# Patient Record
Sex: FEMALE | Race: WHITE | NOT HISPANIC OR LATINO | URBAN - METROPOLITAN AREA
[De-identification: names, ages, dates, MRNs, and addresses within clinical notes are randomized per-mention and may not be internally consistent; named-entity substitution may affect disease eponyms.]

---

## 2023-05-17 LAB
ANION GAP IN SER/PLAS: 10 MMOL/L (ref 10–20)
BASOPHILS (10*3/UL) IN BLOOD BY AUTOMATED COUNT: 0.03 X10E9/L (ref 0–0.1)
BASOPHILS/100 LEUKOCYTES IN BLOOD BY AUTOMATED COUNT: 0.5 % (ref 0–2)
CALCIUM (MG/DL) IN SER/PLAS: 10 MG/DL (ref 8.6–10.3)
CARBON DIOXIDE, TOTAL (MMOL/L) IN SER/PLAS: 27 MMOL/L (ref 21–32)
CHLORIDE (MMOL/L) IN SER/PLAS: 106 MMOL/L (ref 98–107)
CREATININE (MG/DL) IN SER/PLAS: 0.83 MG/DL (ref 0.5–1.05)
EOSINOPHILS (10*3/UL) IN BLOOD BY AUTOMATED COUNT: 0.17 X10E9/L (ref 0–0.4)
EOSINOPHILS/100 LEUKOCYTES IN BLOOD BY AUTOMATED COUNT: 2.7 % (ref 0–6)
ERYTHROCYTE DISTRIBUTION WIDTH (RATIO) BY AUTOMATED COUNT: 12.6 % (ref 11.5–14.5)
ERYTHROCYTE MEAN CORPUSCULAR HEMOGLOBIN CONCENTRATION (G/DL) BY AUTOMATED: 32.3 G/DL (ref 32–36)
ERYTHROCYTE MEAN CORPUSCULAR VOLUME (FL) BY AUTOMATED COUNT: 95 FL (ref 80–100)
ERYTHROCYTES (10*6/UL) IN BLOOD BY AUTOMATED COUNT: 3.71 X10E12/L (ref 4–5.2)
GFR FEMALE: 69 ML/MIN/1.73M2
GLUCOSE (MG/DL) IN SER/PLAS: 90 MG/DL (ref 74–99)
HEMATOCRIT (%) IN BLOOD BY AUTOMATED COUNT: 35.3 % (ref 36–46)
HEMOGLOBIN (G/DL) IN BLOOD: 11.4 G/DL (ref 12–16)
IMMATURE GRANULOCYTES/100 LEUKOCYTES IN BLOOD BY AUTOMATED COUNT: 0.2 % (ref 0–0.9)
LEUKOCYTES (10*3/UL) IN BLOOD BY AUTOMATED COUNT: 6.3 X10E9/L (ref 4.4–11.3)
LYMPHOCYTES (10*3/UL) IN BLOOD BY AUTOMATED COUNT: 1.49 X10E9/L (ref 0.8–3)
LYMPHOCYTES/100 LEUKOCYTES IN BLOOD BY AUTOMATED COUNT: 23.5 % (ref 13–44)
MONOCYTES (10*3/UL) IN BLOOD BY AUTOMATED COUNT: 0.59 X10E9/L (ref 0.05–0.8)
MONOCYTES/100 LEUKOCYTES IN BLOOD BY AUTOMATED COUNT: 9.3 % (ref 2–10)
NEUTROPHILS (10*3/UL) IN BLOOD BY AUTOMATED COUNT: 4.05 X10E9/L (ref 1.6–5.5)
NEUTROPHILS/100 LEUKOCYTES IN BLOOD BY AUTOMATED COUNT: 63.8 % (ref 40–80)
NRBC (PER 100 WBCS) BY AUTOMATED COUNT: 0 /100 WBC (ref 0–0)
PLATELETS (10*3/UL) IN BLOOD AUTOMATED COUNT: 294 X10E9/L (ref 150–450)
POTASSIUM (MMOL/L) IN SER/PLAS: 4.3 MMOL/L (ref 3.5–5.3)
SODIUM (MMOL/L) IN SER/PLAS: 139 MMOL/L (ref 136–145)
UREA NITROGEN (MG/DL) IN SER/PLAS: 23 MG/DL (ref 6–23)

## 2023-05-18 LAB
APPEARANCE, URINE: ABNORMAL
BACTERIA, URINE: ABNORMAL /HPF
BILIRUBIN, URINE: NEGATIVE
BLOOD, URINE: ABNORMAL
CALCIUM OXALATE CRYSTALS, URINE: ABNORMAL /HPF
COLOR, URINE: YELLOW
GLUCOSE, URINE: NEGATIVE MG/DL
KETONES, URINE: NEGATIVE MG/DL
LEUKOCYTE ESTERASE, URINE: ABNORMAL
MUCUS, URINE: ABNORMAL /LPF
NITRITE, URINE: NEGATIVE
PH, URINE: 5 (ref 5–8)
PROTEIN, URINE: NEGATIVE MG/DL
RBC, URINE: 5 /HPF (ref 0–5)
SPECIFIC GRAVITY, URINE: 1.02 (ref 1–1.03)
SQUAMOUS EPITHELIAL CELLS, URINE: 2 /HPF
UROBILINOGEN, URINE: 2 MG/DL (ref 0–1.9)
WBC, URINE: 14 /HPF (ref 0–5)

## 2023-05-19 LAB — URINE CULTURE: NO GROWTH

## 2023-06-23 ENCOUNTER — NURSING HOME VISIT (OUTPATIENT)
Dept: POST ACUTE CARE | Facility: EXTERNAL LOCATION | Age: 88
End: 2023-06-23
Payer: MEDICARE

## 2023-06-23 DIAGNOSIS — E78.00 PURE HYPERCHOLESTEROLEMIA: ICD-10-CM

## 2023-06-23 DIAGNOSIS — F02.80 ALZHEIMERS DISEASE (MULTI): Primary | ICD-10-CM

## 2023-06-23 DIAGNOSIS — M81.0 AGE RELATED OSTEOPOROSIS, UNSPECIFIED PATHOLOGICAL FRACTURE PRESENCE: ICD-10-CM

## 2023-06-23 DIAGNOSIS — G30.9 ALZHEIMERS DISEASE (MULTI): Primary | ICD-10-CM

## 2023-06-23 DIAGNOSIS — H90.3 BILATERAL SENSORINEURAL HEARING LOSS: ICD-10-CM

## 2023-06-23 DIAGNOSIS — E78.2 MIXED HYPERLIPIDEMIA: ICD-10-CM

## 2023-06-23 PROCEDURE — 99305 1ST NF CARE MODERATE MDM 35: CPT | Performed by: INTERNAL MEDICINE

## 2023-06-23 NOTE — LETTER
Patient: Jolene Jack  : 1935    Encounter Date: 2023    HISTORY & PHYSICAL    Subjective  Chief complaint: Jolene Jack is a 87 y.o. female who is a long term resident patient being seen and evaluated for multiple medical problems.  Patient presents for weakness.    HPI:  Patient was transferred from a different nursing facility. Patient has a medical history of hearing loss, and dementia. Patient will be in the long term resident facility. She is a poor historian due to dementia. She is very hard of hearing.  She has no acute complaints. No issues per nursing.        Past Medical History:   Diagnosis Date   • Personal history of diseases of the skin and subcutaneous tissue     History of rosacea   • Personal history of other diseases of the nervous system and sense organs 2020    History of hearing loss   • Personal history of other diseases of the nervous system and sense organs 2014    History of otitis media   • Personal history of other specified conditions     History of fibrocystic disease of breast   • Trigger finger, unspecified finger     Trigger finger       Past Surgical History:   Procedure Laterality Date   • OTHER SURGICAL HISTORY  2021    Hand surgery       Family History   Problem Relation Name Age of Onset   • No Known Problems Mother     • No Known Problems Father         Social History     Socioeconomic History   • Marital status:      Spouse name: Not on file   • Number of children: Not on file   • Years of education: Not on file   • Highest education level: Not on file   Occupational History   • Not on file   Tobacco Use   • Smoking status: Not on file   • Smokeless tobacco: Not on file   Substance and Sexual Activity   • Alcohol use: Not on file   • Drug use: Not on file   • Sexual activity: Not on file   Other Topics Concern   • Not on file   Social History Narrative   • Not on file     Social Determinants of Health     Financial Resource  Strain: Not on file   Food Insecurity: Not on file   Transportation Needs: Not on file   Physical Activity: Not on file   Stress: Not on file   Social Connections: Not on file   Intimate Partner Violence: Not on file   Housing Stability: Not on file       Vital signs: 126/71,97.7, 76, 18, 96%    Objective  Physical Exam  Constitutional:       Comments: Frail elderly female   HENT:      Head: Normocephalic and atraumatic.      Nose: Nose normal.      Mouth/Throat:      Mouth: Mucous membranes are moist.      Pharynx: Oropharynx is clear.   Eyes:      Extraocular Movements: Extraocular movements intact.      Pupils: Pupils are equal, round, and reactive to light.   Cardiovascular:      Rate and Rhythm: Normal rate and regular rhythm.   Pulmonary:      Effort: No respiratory distress.      Breath sounds: Normal breath sounds. No wheezing, rhonchi or rales.   Abdominal:      General: Bowel sounds are normal. There is no distension.      Palpations: Abdomen is soft.      Tenderness: There is no abdominal tenderness. There is no guarding.   Musculoskeletal:      Right lower leg: No edema.      Left lower leg: No edema.   Skin:     General: Skin is warm and dry.   Neurological:      Mental Status: She is alert. Mental status is at baseline.         Assessment/Plan  Problem List Items Addressed This Visit       Alzheimers disease (CMS/[facility]) - Primary    Hyperlipidemia    Bilateral sensorineural hearing loss    Pure hypercholesterolemia    Osteoporosis     Medications, treatments, and labs reviewed  Continue medications and treatments as listed in [facility]    Scribe Attestation  I, Belkys Wallace Scribe   attest that this documentation has been prepared under the direction and in the presence of Andrez Aguilar DO.    An interactive audio and/or video telecommunication system which permits real time communications between the patient (at the originating site) and provider (at a distant site) was utilized to provide this  telehealth service after obtaining verbal consent.    Provider Attestation - Scribe documentation  All medical record entries made by the Scribe were at my direction and personally dictated by me. I have reviewed the chart and agree that the record accurately reflects my personal performance of the history, physical exam, discussion and plan.    Andrez Aguilar DO          Electronically Signed By: Andrez Aguilar DO   6/29/23  9:56 PM

## 2023-06-26 NOTE — PROGRESS NOTES
HISTORY & PHYSICAL    Subjective   Chief complaint: Jolene Jack is a 87 y.o. female who is a long term resident patient being seen and evaluated for multiple medical problems.  Patient presents for weakness.    HPI:  Patient was transferred from a different nursing facility. Patient has a medical history of hearing loss, and dementia. Patient will be in the long term resident facility. She is a poor historian due to dementia. She is very hard of hearing.  She has no acute complaints. No issues per nursing.        Past Medical History:   Diagnosis Date    Personal history of diseases of the skin and subcutaneous tissue     History of rosacea    Personal history of other diseases of the nervous system and sense organs 11/04/2020    History of hearing loss    Personal history of other diseases of the nervous system and sense organs 08/19/2014    History of otitis media    Personal history of other specified conditions     History of fibrocystic disease of breast    Trigger finger, unspecified finger     Trigger finger       Past Surgical History:   Procedure Laterality Date    OTHER SURGICAL HISTORY  08/02/2021    Hand surgery       Family History   Problem Relation Name Age of Onset    No Known Problems Mother      No Known Problems Father         Social History     Socioeconomic History    Marital status:      Spouse name: Not on file    Number of children: Not on file    Years of education: Not on file    Highest education level: Not on file   Occupational History    Not on file   Tobacco Use    Smoking status: Not on file    Smokeless tobacco: Not on file   Substance and Sexual Activity    Alcohol use: Not on file    Drug use: Not on file    Sexual activity: Not on file   Other Topics Concern    Not on file   Social History Narrative    Not on file     Social Determinants of Health     Financial Resource Strain: Not on file   Food Insecurity: Not on file   Transportation Needs: Not on file   Physical  Activity: Not on file   Stress: Not on file   Social Connections: Not on file   Intimate Partner Violence: Not on file   Housing Stability: Not on file       Vital signs: 126/71,97.7, 76, 18, 96%    Objective   Physical Exam  Constitutional:       Comments: Frail elderly female   HENT:      Head: Normocephalic and atraumatic.      Nose: Nose normal.      Mouth/Throat:      Mouth: Mucous membranes are moist.      Pharynx: Oropharynx is clear.   Eyes:      Extraocular Movements: Extraocular movements intact.      Pupils: Pupils are equal, round, and reactive to light.   Cardiovascular:      Rate and Rhythm: Normal rate and regular rhythm.   Pulmonary:      Effort: No respiratory distress.      Breath sounds: Normal breath sounds. No wheezing, rhonchi or rales.   Abdominal:      General: Bowel sounds are normal. There is no distension.      Palpations: Abdomen is soft.      Tenderness: There is no abdominal tenderness. There is no guarding.   Musculoskeletal:      Right lower leg: No edema.      Left lower leg: No edema.   Skin:     General: Skin is warm and dry.   Neurological:      Mental Status: She is alert. Mental status is at baseline.         Assessment/Plan   Problem List Items Addressed This Visit       Alzheimers disease (CMS/[facility]) - Primary    Hyperlipidemia    Bilateral sensorineural hearing loss    Pure hypercholesterolemia    Osteoporosis     Medications, treatments, and labs reviewed  Continue medications and treatments as listed in [facility]    Scribe Attestation  I, Johan Pop   attest that this documentation has been prepared under the direction and in the presence of Andrez Aguilar DO.    An interactive audio and/or video telecommunication system which permits real time communications between the patient (at the originating site) and provider (at a distant site) was utilized to provide this telehealth service after obtaining verbal consent.    Provider Attestation - Scribe  documentation  All medical record entries made by the Scribe were at my direction and personally dictated by me. I have reviewed the chart and agree that the record accurately reflects my personal performance of the history, physical exam, discussion and plan.    Andrez Aguilar, DO

## 2023-06-29 PROBLEM — G30.9 ALZHEIMERS DISEASE (MULTI): Status: ACTIVE | Noted: 2023-06-29

## 2023-06-29 PROBLEM — E78.5 HYPERLIPIDEMIA: Status: ACTIVE | Noted: 2023-06-29

## 2023-06-29 PROBLEM — H90.3 BILATERAL SENSORINEURAL HEARING LOSS: Status: ACTIVE | Noted: 2023-06-29

## 2023-06-29 PROBLEM — M81.0 OSTEOPOROSIS: Status: ACTIVE | Noted: 2023-06-29

## 2023-06-29 PROBLEM — E78.00 PURE HYPERCHOLESTEROLEMIA: Status: ACTIVE | Noted: 2023-06-29

## 2023-06-29 PROBLEM — F02.80 ALZHEIMERS DISEASE (MULTI): Status: ACTIVE | Noted: 2023-06-29

## 2023-07-26 ENCOUNTER — NURSING HOME VISIT (OUTPATIENT)
Dept: POST ACUTE CARE | Facility: EXTERNAL LOCATION | Age: 88
End: 2023-07-26
Payer: MEDICARE

## 2023-07-26 DIAGNOSIS — M81.0 AGE RELATED OSTEOPOROSIS, UNSPECIFIED PATHOLOGICAL FRACTURE PRESENCE: ICD-10-CM

## 2023-07-26 DIAGNOSIS — F02.80 ALZHEIMERS DISEASE (MULTI): Primary | ICD-10-CM

## 2023-07-26 DIAGNOSIS — G30.9 ALZHEIMERS DISEASE (MULTI): Primary | ICD-10-CM

## 2023-07-26 PROCEDURE — 99308 SBSQ NF CARE LOW MDM 20: CPT | Performed by: REGISTERED NURSE

## 2023-07-26 NOTE — LETTER
Patient: Jolene Jack  : 1935    Encounter Date: 2023    PROGRESS NOTE    Subjective  Chief complaint: Jolene Jack is a 87 y.o. female who is a long term care patient being seen and evaluated for monthly general medical care and follow-up.    HPI:  Patient presents for general medical care and f/u.  Patient seen and examined at bedside. Patient has dementia and requires assistance with ADLs. CKD, denies change in urinary frequency or appetite decrease.  No issues per nursing.  Patient has no acute complaints.      Objective  Vital signs: 126/71,76,96% RA    Physical Exam  Constitutional:       General: She is not in acute distress.  Eyes:      Extraocular Movements: Extraocular movements intact.   Pulmonary:      Effort: Pulmonary effort is normal.   Musculoskeletal:      Cervical back: Neck supple.   Neurological:      Mental Status: She is alert.   Psychiatric:         Mood and Affect: Mood normal.         Behavior: Behavior is cooperative.         Assessment/Plan  Problem List Items Addressed This Visit       Alzheimers disease (CMS/[facility]) - Primary     Patient at baseline no behaviors reported by staff         Osteoporosis     Patient appears free from pain          Medications, treatments, and labs reviewed  Continue medications and treatments as listed in EMR      Scribe Attestation  Jeainne VAUGHN Scribe   attest that this documentation has been prepared under the direction and in the presence of MEI Charles.     Provider Attestation - Scribe documentation  All medical record entries made by the Scribe were at my direction and personally dictated by me. I have reviewed the chart and agree that the record accurately reflects my personal performance of the history, physical exam, discussion and plan.   MIE Charles      Electronically Signed By: MEI Charles   23 11:37 AM

## 2023-08-30 NOTE — PROGRESS NOTES
PROGRESS NOTE    Subjective   Chief complaint: Jolene Jack is a 87 y.o. female who is a long term care patient being seen and evaluated for monthly general medical care and follow-up.    HPI:  Patient presents for general medical care and f/u.  Patient seen and examined at bedside. Patient has dementia and requires assistance with ADLs. CKD, denies change in urinary frequency or appetite decrease.  No issues per nursing.  Patient has no acute complaints.      Objective   Vital signs: 126/71,76,96% RA    Physical Exam  Constitutional:       General: She is not in acute distress.  Eyes:      Extraocular Movements: Extraocular movements intact.   Pulmonary:      Effort: Pulmonary effort is normal.   Musculoskeletal:      Cervical back: Neck supple.   Neurological:      Mental Status: She is alert.   Psychiatric:         Mood and Affect: Mood normal.         Behavior: Behavior is cooperative.         Assessment/Plan   Problem List Items Addressed This Visit       Alzheimers disease (CMS/HCC) - Primary     Patient at baseline no behaviors reported by staff         Osteoporosis     Patient appears free from pain          Medications, treatments, and labs reviewed  Continue medications and treatments as listed in EMR      Scribe Attestation  Jeanine VAUGHN Scribe   attest that this documentation has been prepared under the direction and in the presence of MEI Charles.     Provider Attestation - Scribe documentation  All medical record entries made by the Scribe were at my direction and personally dictated by me. I have reviewed the chart and agree that the record accurately reflects my personal performance of the history, physical exam, discussion and plan.   MEI Charles

## 2023-08-31 ENCOUNTER — NURSING HOME VISIT (OUTPATIENT)
Dept: POST ACUTE CARE | Facility: EXTERNAL LOCATION | Age: 88
End: 2023-08-31
Payer: MEDICARE

## 2023-08-31 DIAGNOSIS — G30.9 ALZHEIMERS DISEASE (MULTI): Primary | ICD-10-CM

## 2023-08-31 DIAGNOSIS — F02.80 ALZHEIMERS DISEASE (MULTI): Primary | ICD-10-CM

## 2023-08-31 DIAGNOSIS — F32.A DEPRESSION, UNSPECIFIED DEPRESSION TYPE: ICD-10-CM

## 2023-08-31 DIAGNOSIS — G47.00 INSOMNIA, UNSPECIFIED TYPE: ICD-10-CM

## 2023-08-31 PROCEDURE — 99309 SBSQ NF CARE MODERATE MDM 30: CPT

## 2023-08-31 NOTE — LETTER
Patient: Jolene Jack  : 1935    Encounter Date: 2023    PROGRESS NOTE    Subjective  Chief complaint: Jolene Jack is a 87 y.o. female who is a long term care patient being seen and evaluated for  general medical care and monthly follow-up    HPI:  Patient seen and evaluated for general medical care and monthly follow-up.  Patient at baseline mentation, calm, cooperative, pleasant.  Patient converses easily.  History of depression.  Patient interacts with others.  No changes in appetite or sleep.  Ensure safe environment in secured unit.  No concerns per nursing.          Objective  Vital signs:  126/71-97.7-76-18-96%    Physical Exam  Constitutional:       Appearance: Normal appearance.   HENT:      Head: Normocephalic.      Mouth/Throat:      Mouth: Mucous membranes are moist.   Eyes:      Extraocular Movements: Extraocular movements intact.   Cardiovascular:      Rate and Rhythm: Normal rate and regular rhythm.      Pulses: Normal pulses.      Heart sounds: Normal heart sounds.   Pulmonary:      Effort: Pulmonary effort is normal.      Breath sounds: Normal breath sounds.   Abdominal:      General: Bowel sounds are normal.      Palpations: Abdomen is soft.   Musculoskeletal:         General: Normal range of motion.      Cervical back: Normal range of motion and neck supple.      Comments:   Generalized weakness   Skin:     General: Skin is warm and dry.      Capillary Refill: Capillary refill takes less than 2 seconds.   Neurological:      Mental Status: She is alert. Mental status is at baseline.   Psychiatric:         Mood and Affect: Mood normal.         Behavior: Behavior normal.         Assessment/Plan  Problem List Items Addressed This Visit       Alzheimers disease (CMS/HCC) - Primary      Stable   baseline mentation   ensure safety and secured unit   no outbursts or agitation per nursing   Memantine   monitor         Insomnia      Stable   sleeping well   Melatonin   monitor          Depression      Stable   no self-isolation   interacting with others   no changes in appetite or sleep   Mirtazapine   monitor          Medications, treatments, and labs reviewed  Continue medications and treatments as listed in EMR    MEI Walker      Electronically Signed By: MEI Walker   9/10/23  8:59 PM

## 2023-09-10 PROBLEM — R53.1 WEAKNESS: Status: ACTIVE | Noted: 2023-09-10

## 2023-09-10 PROBLEM — G47.00 INSOMNIA: Status: ACTIVE | Noted: 2023-09-10

## 2023-09-10 PROBLEM — F32.A DEPRESSION: Status: ACTIVE | Noted: 2023-09-10

## 2023-09-11 NOTE — PROGRESS NOTES
PROGRESS NOTE    Subjective   Chief complaint: Jolene Jack is a 87 y.o. female who is a long term care patient being seen and evaluated for  general medical care and monthly follow-up    HPI:  Patient seen and evaluated for general medical care and monthly follow-up.  Patient at baseline mentation, calm, cooperative, pleasant.  Patient converses easily.  History of depression.  Patient interacts with others.  No changes in appetite or sleep.  Ensure safe environment in secured unit.  No concerns per nursing.          Objective   Vital signs:  126/71-97.7-76-18-96%    Physical Exam  Constitutional:       Appearance: Normal appearance.   HENT:      Head: Normocephalic.      Mouth/Throat:      Mouth: Mucous membranes are moist.   Eyes:      Extraocular Movements: Extraocular movements intact.   Cardiovascular:      Rate and Rhythm: Normal rate and regular rhythm.      Pulses: Normal pulses.      Heart sounds: Normal heart sounds.   Pulmonary:      Effort: Pulmonary effort is normal.      Breath sounds: Normal breath sounds.   Abdominal:      General: Bowel sounds are normal.      Palpations: Abdomen is soft.   Musculoskeletal:         General: Normal range of motion.      Cervical back: Normal range of motion and neck supple.      Comments:   Generalized weakness   Skin:     General: Skin is warm and dry.      Capillary Refill: Capillary refill takes less than 2 seconds.   Neurological:      Mental Status: She is alert. Mental status is at baseline.   Psychiatric:         Mood and Affect: Mood normal.         Behavior: Behavior normal.         Assessment/Plan   Problem List Items Addressed This Visit       Alzheimers disease (CMS/HCC) - Primary      Stable   baseline mentation   ensure safety and secured unit   no outbursts or agitation per nursing   Memantine   monitor         Insomnia      Stable   sleeping well   Melatonin   monitor         Depression      Stable   no self-isolation   interacting with  others   no changes in appetite or sleep   Mirtazapine   monitor          Medications, treatments, and labs reviewed  Continue medications and treatments as listed in EMR    Riya Barfield, APRN-CNP

## 2023-09-11 NOTE — ASSESSMENT & PLAN NOTE
Stable   no self-isolation   interacting with others   no changes in appetite or sleep   Mirtazapine   monitor

## 2023-09-11 NOTE — ASSESSMENT & PLAN NOTE
Stable   baseline mentation   ensure safety and secured unit   no outbursts or agitation per nursing   Memantine   monitor

## 2023-09-26 ENCOUNTER — NURSING HOME VISIT (OUTPATIENT)
Dept: POST ACUTE CARE | Facility: EXTERNAL LOCATION | Age: 88
End: 2023-09-26
Payer: MEDICARE

## 2023-09-26 DIAGNOSIS — R53.1 WEAKNESS: ICD-10-CM

## 2023-09-26 DIAGNOSIS — F02.80 ALZHEIMERS DISEASE (MULTI): Primary | ICD-10-CM

## 2023-09-26 DIAGNOSIS — F32.A DEPRESSION, UNSPECIFIED DEPRESSION TYPE: ICD-10-CM

## 2023-09-26 DIAGNOSIS — G30.9 ALZHEIMERS DISEASE (MULTI): Primary | ICD-10-CM

## 2023-09-26 PROCEDURE — 99309 SBSQ NF CARE MODERATE MDM 30: CPT | Performed by: REGISTERED NURSE

## 2023-09-26 NOTE — LETTER
Patient: Jolene Jack  : 1935    Encounter Date: 2023    PROGRESS NOTE    Subjective  Chief complaint: Jolene Jack is a 87 y.o. female patient being seen and evaluated for monthly general medical care and follow-up    HPI:  23 Patient presents for general medical care and f/u.  Patient seen and examined at bedside.  No issues per nursing.  Patient has no acute complaints.          Objective  Vital signs: 126/71, 97.7, 76, 96%    Physical Exam  Constitutional:       General: She is not in acute distress.  Eyes:      Extraocular Movements: Extraocular movements intact.   Pulmonary:      Effort: Pulmonary effort is normal.   Musculoskeletal:      Cervical back: Neck supple.   Neurological:      Mental Status: She is alert.   Psychiatric:         Mood and Affect: Mood normal.         Behavior: Behavior is cooperative.         Assessment/Plan  Problem List Items Addressed This Visit       Alzheimers disease (CMS/HCC) - Primary     Patient at baseline no behaviors reported by staff         Weakness     Encourage pt to ask for assistance          Depression     Stable           Medications, treatments, and labs reviewed  Continue medications and treatments as listed in EMR    Scribe Attestation  IBelkys Scribe   attest that this documentation has been prepared under the direction and in the presence of MEI Charles.    Provider Attestation - Scribe documentation  All medical record entries made by the Scribe were at my direction and personally dictated by me. I have reviewed the chart and agree that the record accurately reflects my personal performance of the history, physical exam, discussion and plan.    MEI Charles      Electronically Signed By: MEI Charles   10/14/23  9:34 PM

## 2023-10-12 NOTE — PROGRESS NOTES
PROGRESS NOTE    Subjective   Chief complaint: Jolene Jack is a 87 y.o. female patient being seen and evaluated for monthly general medical care and follow-up    HPI:  9/26/23 Patient presents for general medical care and f/u.  Patient seen and examined at bedside.  No issues per nursing.  Patient has no acute complaints.          Objective   Vital signs: 126/71, 97.7, 76, 96%    Physical Exam  Constitutional:       General: She is not in acute distress.  Eyes:      Extraocular Movements: Extraocular movements intact.   Pulmonary:      Effort: Pulmonary effort is normal.   Musculoskeletal:      Cervical back: Neck supple.   Neurological:      Mental Status: She is alert.   Psychiatric:         Mood and Affect: Mood normal.         Behavior: Behavior is cooperative.         Assessment/Plan   Problem List Items Addressed This Visit       Alzheimers disease (CMS/[facility]) - Primary     Patient at baseline no behaviors reported by staff         Weakness     Encourage pt to ask for assistance          Depression     Stable           Medications, treatments, and labs reviewed  Continue medications and treatments as listed in EMR    Scribe Attestation  IBelkys Scribe   attest that this documentation has been prepared under the direction and in the presence of MEI Charles.    Provider Attestation - Scribe documentation  All medical record entries made by the Scribe were at my direction and personally dictated by me. I have reviewed the chart and agree that the record accurately reflects my personal performance of the history, physical exam, discussion and plan.    MEI Charles

## 2023-10-30 ENCOUNTER — NURSING HOME VISIT (OUTPATIENT)
Dept: POST ACUTE CARE | Facility: EXTERNAL LOCATION | Age: 88
End: 2023-10-30
Payer: MEDICARE

## 2023-10-30 DIAGNOSIS — R53.1 WEAKNESS: ICD-10-CM

## 2023-10-30 DIAGNOSIS — F32.A DEPRESSION, UNSPECIFIED DEPRESSION TYPE: ICD-10-CM

## 2023-10-30 DIAGNOSIS — G30.9 ALZHEIMERS DISEASE (MULTI): Primary | ICD-10-CM

## 2023-10-30 DIAGNOSIS — F02.80 ALZHEIMERS DISEASE (MULTI): Primary | ICD-10-CM

## 2023-10-30 PROCEDURE — 99309 SBSQ NF CARE MODERATE MDM 30: CPT | Performed by: REGISTERED NURSE

## 2023-10-30 NOTE — LETTER
Patient: Jolene Jack  : 1935    Encounter Date: 10/30/2023    PROGRESS NOTE    Subjective  Chief complaint: Jolene Jack is a 87 y.o. female patient being seen and evaluated for monthly general medical care and follow-up    HPI:  10/30/23 Patient presents for general medical care and f/u.  Patient seen and examined at bedside.  No issues per nursing.  Patient has no acute complaints.        Objective  Vital signs: 120/60, 96.2, 81, 100%    Physical Exam  Constitutional:       General: She is not in acute distress.  Eyes:      Extraocular Movements: Extraocular movements intact.   Pulmonary:      Effort: Pulmonary effort is normal.   Musculoskeletal:      Cervical back: Neck supple.   Neurological:      Mental Status: She is alert.   Psychiatric:         Mood and Affect: Mood normal.         Behavior: Behavior is cooperative.         Assessment/Plan  Problem List Items Addressed This Visit       Alzheimers disease (CMS/HCC) - Primary     Patient at baseline no behaviors reported by staff         Weakness     Encourage pt to ask for assistance          Depression     Stable           Medications, treatments, and labs reviewed  Continue medications and treatments as listed in EMR    Scribe Attestation  IBelkys Scribe   attest that this documentation has been prepared under the direction and in the presence of MEI Charles.    Provider Attestation - Scribe documentation  All medical record entries made by the Scribe were at my direction and personally dictated by me. I have reviewed the chart and agree that the record accurately reflects my personal performance of the history, physical exam, discussion and plan.    MEI Charles      Electronically Signed By: MEI Charles   23  2:42 PM

## 2023-11-07 NOTE — PROGRESS NOTES
PROGRESS NOTE    Subjective   Chief complaint: Jolene Jack is a 87 y.o. female patient being seen and evaluated for monthly general medical care and follow-up    HPI:  10/30/23 Patient presents for general medical care and f/u.  Patient seen and examined at bedside.  No issues per nursing.  Patient has no acute complaints.        Objective   Vital signs: 120/60, 96.2, 81, 100%    Physical Exam  Constitutional:       General: She is not in acute distress.  Eyes:      Extraocular Movements: Extraocular movements intact.   Pulmonary:      Effort: Pulmonary effort is normal.   Musculoskeletal:      Cervical back: Neck supple.   Neurological:      Mental Status: She is alert.   Psychiatric:         Mood and Affect: Mood normal.         Behavior: Behavior is cooperative.         Assessment/Plan   Problem List Items Addressed This Visit       Alzheimers disease (CMS/[facility]) - Primary     Patient at baseline no behaviors reported by staff         Weakness     Encourage pt to ask for assistance          Depression     Stable           Medications, treatments, and labs reviewed  Continue medications and treatments as listed in EMR    Scribe Attestation  IBelkys Scribe   attest that this documentation has been prepared under the direction and in the presence of MEI Charles.    Provider Attestation - Scribe documentation  All medical record entries made by the Scribe were at my direction and personally dictated by me. I have reviewed the chart and agree that the record accurately reflects my personal performance of the history, physical exam, discussion and plan.    MEI Charles

## 2023-11-21 ENCOUNTER — NURSING HOME VISIT (OUTPATIENT)
Dept: POST ACUTE CARE | Facility: EXTERNAL LOCATION | Age: 88
End: 2023-11-21
Payer: COMMERCIAL

## 2023-11-21 DIAGNOSIS — U07.1 COVID: Primary | ICD-10-CM

## 2023-11-21 PROCEDURE — 99308 SBSQ NF CARE LOW MDM 20: CPT | Performed by: REGISTERED NURSE

## 2023-11-21 NOTE — LETTER
Patient: Jolene Jack  : 1935    Encounter Date: 2023    PROGRESS NOTE    Subjective  Chief complaint: Jolene Jack is a 87 y.o. female who is a long term care patient being seen and evaluated for COVID-19.    HPI:  23 Patient was seen and examined at bedside. Patient tested positive for COVID-19. No other concerns per nursing.       Objective  Vital signs: 128/75, 98.7, 84, 99%    Physical Exam  Constitutional:       General: She is not in acute distress.  Eyes:      Extraocular Movements: Extraocular movements intact.   Pulmonary:      Effort: Pulmonary effort is normal.   Musculoskeletal:      Cervical back: Neck supple.   Neurological:      Mental Status: She is alert.   Psychiatric:         Mood and Affect: Mood normal.         Behavior: Behavior is cooperative.         Assessment/Plan  Problem List Items Addressed This Visit       COVID - Primary     Stable   No sob  Symptomatic care           Medications, treatments, and labs reviewed  Continue medications and treatments as listed in [facility]    Scribe Attestation  IBelkys Scribe   attest that this documentation has been prepared under the direction and in the presence of MEI Charles.    Provider Attestation - Scribe documentation  All medical record entries made by the Scribe were at my direction and personally dictated by me. I have reviewed the chart and agree that the record accurately reflects my personal performance of the history, physical exam, discussion and plan.    MEI Charles          Electronically Signed By: MEI Charles   23 10:36 AM

## 2023-11-22 ENCOUNTER — NURSING HOME VISIT (OUTPATIENT)
Dept: POST ACUTE CARE | Facility: EXTERNAL LOCATION | Age: 88
End: 2023-11-22
Payer: COMMERCIAL

## 2023-11-22 DIAGNOSIS — U07.1 COVID: Primary | ICD-10-CM

## 2023-11-22 PROCEDURE — 99308 SBSQ NF CARE LOW MDM 20: CPT | Performed by: REGISTERED NURSE

## 2023-11-22 NOTE — LETTER
Patient: Jolene Jack  : 1935    Encounter Date: 2023    PROGRESS NOTE    Subjective  Chief complaint: Jolene Jack is a 87 y.o. female who is a long term care patient being seen and evaluated for COVID-19.    HPI:  23 Patient was seen at bedside for COVID-19. Patient has no other complaints.       Objective  Vital signs: 128/75, 98.7, 84, 99%    Physical Exam  Constitutional:       General: She is not in acute distress.  Eyes:      Extraocular Movements: Extraocular movements intact.   Pulmonary:      Effort: Pulmonary effort is normal.   Musculoskeletal:      Cervical back: Neck supple.   Neurological:      Mental Status: She is alert.   Psychiatric:         Mood and Affect: Mood normal.         Behavior: Behavior is cooperative.         Assessment/Plan  Problem List Items Addressed This Visit       COVID - Primary     Stable   No sob  Symptomatic care           Medications, treatments, and labs reviewed  Continue medications and treatments as listed in [facility]    Scribe Attestation  IBelkys Scribe   attest that this documentation has been prepared under the direction and in the presence of MEI Charles.    Provider Attestation - Scribe documentation  All medical record entries made by the Scribe were at my direction and personally dictated by me. I have reviewed the chart and agree that the record accurately reflects my personal performance of the history, physical exam, discussion and plan.    MEI Charles          Electronically Signed By: MEI Charles   23  9:34 AM

## 2023-11-27 ENCOUNTER — NURSING HOME VISIT (OUTPATIENT)
Dept: POST ACUTE CARE | Facility: EXTERNAL LOCATION | Age: 88
End: 2023-11-27
Payer: COMMERCIAL

## 2023-11-27 DIAGNOSIS — U07.1 COVID: Primary | ICD-10-CM

## 2023-11-27 PROCEDURE — 99308 SBSQ NF CARE LOW MDM 20: CPT | Performed by: REGISTERED NURSE

## 2023-11-27 NOTE — LETTER
Patient: Jolene Jack  : 1935    Encounter Date: 2023    PROGRESS NOTE    Subjective  Chief complaint: Jolene Jack is a 87 y.o. female who is a long term care patient being seen and evaluated for COVID-19.    HPI:  23 Patient was seen and examined at bedside. Patient has COVID-19. No other concerns per nursing.       Objective  Vital signs: 128/75, 97.8, 84, 94%    Physical Exam  Constitutional:       General: She is not in acute distress.  Eyes:      Extraocular Movements: Extraocular movements intact.   Pulmonary:      Effort: Pulmonary effort is normal.   Musculoskeletal:      Cervical back: Neck supple.   Neurological:      Mental Status: She is alert.   Psychiatric:         Mood and Affect: Mood normal.         Behavior: Behavior is cooperative.         Assessment/Plan  Problem List Items Addressed This Visit       COVID - Primary     Supportive care  Isolation           Medications, treatments, and labs reviewed  Continue medications and treatments as listed in [facility]    Scribe Attestation  IBelkys Scribe   attest that this documentation has been prepared under the direction and in the presence of MEI Charles.    Provider Attestation - Scribe documentation  All medical record entries made by the Scribe were at my direction and personally dictated by me. I have reviewed the chart and agree that the record accurately reflects my personal performance of the history, physical exam, discussion and plan.    MEI Charles          Electronically Signed By: MEI Charles   23 12:23 PM

## 2023-11-28 NOTE — PROGRESS NOTES
PROGRESS NOTE    Subjective   Chief complaint: Jolene Jack is a 87 y.o. female who is a long term care patient being seen and evaluated for COVID-19.    HPI:  11/21/23 Patient was seen and examined at bedside. Patient tested positive for COVID-19. No other concerns per nursing.       Objective   Vital signs: 128/75, 98.7, 84, 99%    Physical Exam  Constitutional:       General: She is not in acute distress.  Eyes:      Extraocular Movements: Extraocular movements intact.   Pulmonary:      Effort: Pulmonary effort is normal.   Musculoskeletal:      Cervical back: Neck supple.   Neurological:      Mental Status: She is alert.   Psychiatric:         Mood and Affect: Mood normal.         Behavior: Behavior is cooperative.         Assessment/Plan   Problem List Items Addressed This Visit       COVID - Primary     Stable   No sob  Symptomatic care           Medications, treatments, and labs reviewed  Continue medications and treatments as listed in [facility]    Scribe Attestation  RODERICK, Johan Pop   attest that this documentation has been prepared under the direction and in the presence of MEI Charles.    Provider Attestation - Scribe documentation  All medical record entries made by the Scribe were at my direction and personally dictated by me. I have reviewed the chart and agree that the record accurately reflects my personal performance of the history, physical exam, discussion and plan.    MEI Charles

## 2023-11-29 ENCOUNTER — NURSING HOME VISIT (OUTPATIENT)
Dept: POST ACUTE CARE | Facility: EXTERNAL LOCATION | Age: 88
End: 2023-11-29
Payer: COMMERCIAL

## 2023-11-29 DIAGNOSIS — F32.A DEPRESSION, UNSPECIFIED DEPRESSION TYPE: ICD-10-CM

## 2023-11-29 DIAGNOSIS — G47.00 INSOMNIA, UNSPECIFIED TYPE: Primary | ICD-10-CM

## 2023-11-29 DIAGNOSIS — G30.9 ALZHEIMERS DISEASE (MULTI): ICD-10-CM

## 2023-11-29 DIAGNOSIS — F02.80 ALZHEIMERS DISEASE (MULTI): ICD-10-CM

## 2023-11-29 PROCEDURE — 99309 SBSQ NF CARE MODERATE MDM 30: CPT | Performed by: REGISTERED NURSE

## 2023-11-29 NOTE — PROGRESS NOTES
PROGRESS NOTE    Subjective   Chief complaint: Jolene Jack is a 87 y.o. female who is a long term care patient being seen and evaluated for COVID-19.    HPI:  11/22/23 Patient was seen at bedside for COVID-19. Patient has no other complaints.       Objective   Vital signs: 128/75, 98.7, 84, 99%    Physical Exam  Constitutional:       General: She is not in acute distress.  Eyes:      Extraocular Movements: Extraocular movements intact.   Pulmonary:      Effort: Pulmonary effort is normal.   Musculoskeletal:      Cervical back: Neck supple.   Neurological:      Mental Status: She is alert.   Psychiatric:         Mood and Affect: Mood normal.         Behavior: Behavior is cooperative.         Assessment/Plan   Problem List Items Addressed This Visit       COVID - Primary     Stable   No sob  Symptomatic care           Medications, treatments, and labs reviewed  Continue medications and treatments as listed in [facility]    Scribe Attestation  I, Johan Pop   attest that this documentation has been prepared under the direction and in the presence of MEI Charles.    Provider Attestation - Scribe documentation  All medical record entries made by the Scribe were at my direction and personally dictated by me. I have reviewed the chart and agree that the record accurately reflects my personal performance of the history, physical exam, discussion and plan.    MEI Charles

## 2023-11-29 NOTE — LETTER
Patient: Jolene Jack  : 1935    Encounter Date: 2023    PROGRESS NOTE    Subjective  Chief complaint: Jolene Jack is a 87 y.o. female patient being seen and evaluated for monthly general medical care and follow-up    HPI:  23 Patient presents for general medical care and f/u.  Patient seen and examined at bedside.  No issues per nursing.  Patient has no acute complaints.        Objective  Vital signs: 2113/69, 97.6, 77, 95%    Physical Exam  Constitutional:       General: She is not in acute distress.  Eyes:      Extraocular Movements: Extraocular movements intact.   Pulmonary:      Effort: Pulmonary effort is normal.   Musculoskeletal:      Cervical back: Neck supple.   Neurological:      Mental Status: She is alert.   Psychiatric:         Mood and Affect: Mood normal.         Behavior: Behavior is cooperative.         Assessment/Plan  Problem List Items Addressed This Visit       Alzheimers disease (CMS/HCC)     Patient at baseline no behaviors reported by staff         Insomnia - Primary     Stable   sleeping well   Melatonin   monitor         Depression     Stable           Medications, treatments, and labs reviewed  Continue medications and treatments as listed in EMR    Scribe Attestation  IBelkys Scribe   attest that this documentation has been prepared under the direction and in the presence of MEI Charles.    Provider Attestation - Scribe documentation  All medical record entries made by the Scribe were at my direction and personally dictated by me. I have reviewed the chart and agree that the record accurately reflects my personal performance of the history, physical exam, discussion and plan.    MEI Charles      Electronically Signed By: MEI Charles   23  5:01 PM

## 2023-12-04 NOTE — PROGRESS NOTES
PROGRESS NOTE    Subjective   Chief complaint: Jolene Jack is a 87 y.o. female who is a long term care patient being seen and evaluated for COVID-19.    HPI:  11/27/23 Patient was seen and examined at bedside. Patient has COVID-19. No other concerns per nursing.       Objective   Vital signs: 128/75, 97.8, 84, 94%    Physical Exam  Constitutional:       General: She is not in acute distress.  Eyes:      Extraocular Movements: Extraocular movements intact.   Pulmonary:      Effort: Pulmonary effort is normal.   Musculoskeletal:      Cervical back: Neck supple.   Neurological:      Mental Status: She is alert.   Psychiatric:         Mood and Affect: Mood normal.         Behavior: Behavior is cooperative.         Assessment/Plan   Problem List Items Addressed This Visit       COVID - Primary     Supportive care  Isolation           Medications, treatments, and labs reviewed  Continue medications and treatments as listed in [facility]    Scribe Attestation  Belkys VAUGHN Scribe   attest that this documentation has been prepared under the direction and in the presence of MEI Charles.    Provider Attestation - Scribe documentation  All medical record entries made by the Scribe were at my direction and personally dictated by me. I have reviewed the chart and agree that the record accurately reflects my personal performance of the history, physical exam, discussion and plan.    MEI Charles

## 2023-12-06 NOTE — PROGRESS NOTES
PROGRESS NOTE    Subjective   Chief complaint: Jolene Jack is a 87 y.o. female patient being seen and evaluated for monthly general medical care and follow-up    HPI:  11/29/23 Patient presents for general medical care and f/u.  Patient seen and examined at bedside.  No issues per nursing.  Patient has no acute complaints.        Objective   Vital signs: 2113/69, 97.6, 77, 95%    Physical Exam  Constitutional:       General: She is not in acute distress.  Eyes:      Extraocular Movements: Extraocular movements intact.   Pulmonary:      Effort: Pulmonary effort is normal.   Musculoskeletal:      Cervical back: Neck supple.   Neurological:      Mental Status: She is alert.   Psychiatric:         Mood and Affect: Mood normal.         Behavior: Behavior is cooperative.         Assessment/Plan   Problem List Items Addressed This Visit       Alzheimers disease (CMS/[facility])     Patient at baseline no behaviors reported by staff         Insomnia - Primary     Stable   sleeping well   Melatonin   monitor         Depression     Stable           Medications, treatments, and labs reviewed  Continue medications and treatments as listed in EMR    Scribe Attestation  IBelkys Scribe   attest that this documentation has been prepared under the direction and in the presence of MEI Charles.    Provider Attestation - Scribe documentation  All medical record entries made by the Scribe were at my direction and personally dictated by me. I have reviewed the chart and agree that the record accurately reflects my personal performance of the history, physical exam, discussion and plan.    MEI Charles

## 2023-12-07 PROBLEM — U07.1 COVID: Status: ACTIVE | Noted: 2023-12-07

## 2023-12-27 ENCOUNTER — NURSING HOME VISIT (OUTPATIENT)
Dept: POST ACUTE CARE | Facility: EXTERNAL LOCATION | Age: 88
End: 2023-12-27
Payer: COMMERCIAL

## 2023-12-27 DIAGNOSIS — F32.A DEPRESSION, UNSPECIFIED DEPRESSION TYPE: ICD-10-CM

## 2023-12-27 DIAGNOSIS — F02.80 ALZHEIMERS DISEASE (MULTI): Primary | ICD-10-CM

## 2023-12-27 DIAGNOSIS — R53.1 WEAKNESS: ICD-10-CM

## 2023-12-27 DIAGNOSIS — G30.9 ALZHEIMERS DISEASE (MULTI): Primary | ICD-10-CM

## 2023-12-27 PROCEDURE — 99309 SBSQ NF CARE MODERATE MDM 30: CPT | Performed by: REGISTERED NURSE

## 2024-01-04 NOTE — PROGRESS NOTES
PROGRESS NOTE    Subjective   Chief complaint: Jolene Jack is a 88 y.o. female patient being seen and evaluated for monthly general medical care and follow-up    HPI:  12/27/23 Patient presents for general medical care and f/u.  Patient seen and examined at bedside.  No issues per nursing.  Patient has no acute complaints.          Objective   Vital signs: 130/51, 97.7, 78, 95%    Physical Exam  Constitutional:       General: She is not in acute distress.  Eyes:      Extraocular Movements: Extraocular movements intact.   Pulmonary:      Effort: Pulmonary effort is normal.   Musculoskeletal:      Cervical back: Neck supple.   Neurological:      Mental Status: She is alert.   Psychiatric:         Mood and Affect: Mood normal.         Behavior: Behavior is cooperative.         Assessment/Plan   Problem List Items Addressed This Visit       Alzheimers disease (CMS/[facility]) - Primary     Patient at baseline no behaviors reported by staff         Weakness     Encourage pt to ask for assistance          Depression     Stable           Medications, treatments, and labs reviewed  Continue medications and treatments as listed in EMR    Scribe Attestation  IBelkys Scribe   attest that this documentation has been prepared under the direction and in the presence of MEI Charles.    Provider Attestation - Scribe documentation  All medical record entries made by the Scribe were at my direction and personally dictated by me. I have reviewed the chart and agree that the record accurately reflects my personal performance of the history, physical exam, discussion and plan.    MEI Charles

## 2024-01-29 ENCOUNTER — NURSING HOME VISIT (OUTPATIENT)
Dept: POST ACUTE CARE | Facility: EXTERNAL LOCATION | Age: 89
End: 2024-01-29
Payer: COMMERCIAL

## 2024-01-29 DIAGNOSIS — F32.A DEPRESSION, UNSPECIFIED DEPRESSION TYPE: ICD-10-CM

## 2024-01-29 DIAGNOSIS — F02.80 ALZHEIMERS DISEASE (MULTI): Primary | ICD-10-CM

## 2024-01-29 DIAGNOSIS — R53.1 WEAKNESS: ICD-10-CM

## 2024-01-29 DIAGNOSIS — G30.9 ALZHEIMERS DISEASE (MULTI): Primary | ICD-10-CM

## 2024-01-29 PROCEDURE — 99309 SBSQ NF CARE MODERATE MDM 30: CPT | Performed by: REGISTERED NURSE

## 2024-01-29 NOTE — LETTER
Patient: Jolnee Jack  : 1935    Encounter Date: 2024    PROGRESS NOTE    Subjective  Chief complaint: Jolene Jack is a 88 y.o. female patient being seen and evaluated for monthly general medical care and follow-up    HPI:  24 Patient presents for general medical care and f/u.  Patient seen and examined at bedside.  No issues per nursing.  Patient has no acute complaints.          Objective  Vital signs: 130/51    Physical Exam  Constitutional:       General: She is not in acute distress.  Eyes:      Extraocular Movements: Extraocular movements intact.   Pulmonary:      Effort: Pulmonary effort is normal.   Musculoskeletal:      Cervical back: Neck supple.   Neurological:      Mental Status: She is alert.   Psychiatric:         Mood and Affect: Mood normal.         Behavior: Behavior is cooperative.         Assessment/Plan  Problem List Items Addressed This Visit       Alzheimers disease (CMS/HCC) - Primary     Patient at baseline no behaviors reported by staff         Weakness     Encourage pt to ask for assistance          Depression     Stable           Medications, treatments, and labs reviewed  Continue medications and treatments as listed in EMR    Scribe Attestation  IBelkys Scribe   attest that this documentation has been prepared under the direction and in the presence of MEI Charles.    Provider Attestation - Scribe documentation  All medical record entries made by the Scribe were at my direction and personally dictated by me. I have reviewed the chart and agree that the record accurately reflects my personal performance of the history, physical exam, discussion and plan.    MEI Charles      Electronically Signed By: MEI Charles   24 10:30 AM

## 2024-01-31 NOTE — PROGRESS NOTES
PROGRESS NOTE    Subjective   Chief complaint: Jolene Jack is a 88 y.o. female patient being seen and evaluated for monthly general medical care and follow-up    HPI:  1/29/24 Patient presents for general medical care and f/u.  Patient seen and examined at bedside.  No issues per nursing.  Patient has no acute complaints.          Objective   Vital signs: 130/51    Physical Exam  Constitutional:       General: She is not in acute distress.  Eyes:      Extraocular Movements: Extraocular movements intact.   Pulmonary:      Effort: Pulmonary effort is normal.   Musculoskeletal:      Cervical back: Neck supple.   Neurological:      Mental Status: She is alert.   Psychiatric:         Mood and Affect: Mood normal.         Behavior: Behavior is cooperative.         Assessment/Plan   Problem List Items Addressed This Visit       Alzheimers disease (CMS/[facility]) - Primary     Patient at baseline no behaviors reported by staff         Weakness     Encourage pt to ask for assistance          Depression     Stable           Medications, treatments, and labs reviewed  Continue medications and treatments as listed in EMR    Scribe Attestation  IBelkys Scribe   attest that this documentation has been prepared under the direction and in the presence of MEI Charles.    Provider Attestation - Scribe documentation  All medical record entries made by the Scribe were at my direction and personally dictated by me. I have reviewed the chart and agree that the record accurately reflects my personal performance of the history, physical exam, discussion and plan.    MEI Charles

## 2024-02-26 ENCOUNTER — NURSING HOME VISIT (OUTPATIENT)
Dept: POST ACUTE CARE | Facility: EXTERNAL LOCATION | Age: 89
End: 2024-02-26
Payer: COMMERCIAL

## 2024-02-26 DIAGNOSIS — M81.0 AGE RELATED OSTEOPOROSIS, UNSPECIFIED PATHOLOGICAL FRACTURE PRESENCE: ICD-10-CM

## 2024-02-26 DIAGNOSIS — R53.1 WEAKNESS: ICD-10-CM

## 2024-02-26 DIAGNOSIS — G30.9 ALZHEIMERS DISEASE (MULTI): Primary | ICD-10-CM

## 2024-02-26 DIAGNOSIS — G47.00 INSOMNIA, UNSPECIFIED TYPE: ICD-10-CM

## 2024-02-26 DIAGNOSIS — F02.80 ALZHEIMERS DISEASE (MULTI): Primary | ICD-10-CM

## 2024-02-26 PROCEDURE — 99309 SBSQ NF CARE MODERATE MDM 30: CPT | Performed by: REGISTERED NURSE

## 2024-02-26 NOTE — LETTER
Patient: Jolene Jack  : 1935    Encounter Date: 2024    PROGRESS NOTE    Subjective  Chief complaint: Jolene Jack is a 88 y.o. female patient being seen and evaluated for monthly general medical care and follow-up    HPI:  24 Patient presents for general medical care and f/u.  Patient seen and examined at bedside.  No issues per nursing.  Patient has no acute complaints.          Objective  Vital signs: 130/51, 97.7, 78, 16, 95%    Physical Exam  Constitutional:       General: She is not in acute distress.  Eyes:      Extraocular Movements: Extraocular movements intact.   Pulmonary:      Effort: Pulmonary effort is normal.   Musculoskeletal:      Cervical back: Neck supple.   Neurological:      Mental Status: She is alert.   Psychiatric:         Mood and Affect: Mood normal.         Behavior: Behavior is cooperative.         Assessment/Plan  Problem List Items Addressed This Visit       Alzheimers disease (CMS/[facility]) - Primary     Patient at baseline no behaviors reported by staff         Osteoporosis     Patient appears free from pain         Weakness     Encourage pt to ask for assistance          Insomnia     Stable   sleeping well   Melatonin   monitor          Medications, treatments, and labs reviewed  Continue medications and treatments as listed in EMR    Scribe Attestation  I, Johan Pop   attest that this documentation has been prepared under the direction and in the presence of MEI Charles.    Provider Attestation - Scribe documentation  All medical record entries made by the Scribe were at my direction and personally dictated by me. I have reviewed the chart and agree that the record accurately reflects my personal performance of the history, physical exam, discussion and plan.    MEI Charles      Electronically Signed By: MEI Charles   3/14/24 12:26 PM

## 2024-02-27 NOTE — PROGRESS NOTES
PROGRESS NOTE    Subjective   Chief complaint: Jolene Jack is a 88 y.o. female patient being seen and evaluated for monthly general medical care and follow-up    HPI:  2/26/24 Patient presents for general medical care and f/u.  Patient seen and examined at bedside.  No issues per nursing.  Patient has no acute complaints.          Objective   Vital signs: 130/51, 97.7, 78, 16, 95%    Physical Exam  Constitutional:       General: She is not in acute distress.  Eyes:      Extraocular Movements: Extraocular movements intact.   Pulmonary:      Effort: Pulmonary effort is normal.   Musculoskeletal:      Cervical back: Neck supple.   Neurological:      Mental Status: She is alert.   Psychiatric:         Mood and Affect: Mood normal.         Behavior: Behavior is cooperative.         Assessment/Plan   Problem List Items Addressed This Visit       Alzheimers disease (CMS/[facility]) - Primary     Patient at baseline no behaviors reported by staff         Osteoporosis     Patient appears free from pain         Weakness     Encourage pt to ask for assistance          Insomnia     Stable   sleeping well   Melatonin   monitor          Medications, treatments, and labs reviewed  Continue medications and treatments as listed in EMR    Scribe Attestation  I, Johan Pop   attest that this documentation has been prepared under the direction and in the presence of MEI Charles.    Provider Attestation - Scribe documentation  All medical record entries made by the Scribe were at my direction and personally dictated by me. I have reviewed the chart and agree that the record accurately reflects my personal performance of the history, physical exam, discussion and plan.    MEI Charles

## 2024-03-25 ENCOUNTER — NURSING HOME VISIT (OUTPATIENT)
Dept: POST ACUTE CARE | Facility: EXTERNAL LOCATION | Age: 89
End: 2024-03-25
Payer: COMMERCIAL

## 2024-03-25 DIAGNOSIS — F02.80 ALZHEIMERS DISEASE (MULTI): Primary | ICD-10-CM

## 2024-03-25 DIAGNOSIS — G47.00 INSOMNIA, UNSPECIFIED TYPE: ICD-10-CM

## 2024-03-25 DIAGNOSIS — R53.1 WEAKNESS: ICD-10-CM

## 2024-03-25 DIAGNOSIS — G30.9 ALZHEIMERS DISEASE (MULTI): Primary | ICD-10-CM

## 2024-03-25 PROCEDURE — 99309 SBSQ NF CARE MODERATE MDM 30: CPT | Performed by: REGISTERED NURSE

## 2024-03-25 NOTE — LETTER
Patient: Jolene Jack  : 1935    Encounter Date: 2024    PROGRESS NOTE    Subjective  Chief complaint: Jolene Jack is a 88 y.o. female patient being seen and evaluated for monthly general medical care and follow-up    HPI:  3/25/24 Patient presents for general medical care and f/u.  Patient seen and examined at bedside.  No issues per nursing.  Patient has no acute complaints.          Objective  Vital signs: 130/51, 97.7, 78, 16, 95%    Physical Exam  Constitutional:       General: She is not in acute distress.  Eyes:      Extraocular Movements: Extraocular movements intact.   Pulmonary:      Effort: Pulmonary effort is normal.   Musculoskeletal:      Cervical back: Neck supple.   Neurological:      Mental Status: She is alert.   Psychiatric:         Mood and Affect: Mood normal.         Behavior: Behavior is cooperative.         Assessment/Plan  Problem List Items Addressed This Visit       Alzheimers disease (Multi) - Primary     Patient at baseline no behaviors reported by staff         Weakness     Encourage pt to ask for assistance          Insomnia     Stable   sleeping well   Melatonin   monitor          Medications, treatments, and labs reviewed  Continue medications and treatments as listed in EMR    Scribe Attestation  I, Johan Pop   attest that this documentation has been prepared under the direction and in the presence of MEI Charles.    Provider Attestation - Scribe documentation  All medical record entries made by the Scribe were at my direction and personally dictated by me. I have reviewed the chart and agree that the record accurately reflects my personal performance of the history, physical exam, discussion and plan.    MEI Charles      Electronically Signed By: MEI Charles   24  1:22 PM

## 2024-03-26 NOTE — PROGRESS NOTES
PROGRESS NOTE    Subjective   Chief complaint: Jolene Jack is a 88 y.o. female patient being seen and evaluated for monthly general medical care and follow-up    HPI:  3/25/24 Patient presents for general medical care and f/u.  Patient seen and examined at bedside.  No issues per nursing.  Patient has no acute complaints.          Objective   Vital signs: 130/51, 97.7, 78, 16, 95%    Physical Exam  Constitutional:       General: She is not in acute distress.  Eyes:      Extraocular Movements: Extraocular movements intact.   Pulmonary:      Effort: Pulmonary effort is normal.   Musculoskeletal:      Cervical back: Neck supple.   Neurological:      Mental Status: She is alert.   Psychiatric:         Mood and Affect: Mood normal.         Behavior: Behavior is cooperative.         Assessment/Plan   Problem List Items Addressed This Visit       Alzheimers disease (Multi) - Primary     Patient at baseline no behaviors reported by staff         Weakness     Encourage pt to ask for assistance          Insomnia     Stable   sleeping well   Melatonin   monitor          Medications, treatments, and labs reviewed  Continue medications and treatments as listed in EMR    Scribe Attestation  IBelkys Scribe   attest that this documentation has been prepared under the direction and in the presence of MEI Charles.    Provider Attestation - Scribe documentation  All medical record entries made by the Scribe were at my direction and personally dictated by me. I have reviewed the chart and agree that the record accurately reflects my personal performance of the history, physical exam, discussion and plan.    MEI Charles

## 2024-04-29 ENCOUNTER — NURSING HOME VISIT (OUTPATIENT)
Dept: POST ACUTE CARE | Facility: EXTERNAL LOCATION | Age: 89
End: 2024-04-29
Payer: COMMERCIAL

## 2024-04-29 DIAGNOSIS — F32.A DEPRESSION, UNSPECIFIED DEPRESSION TYPE: ICD-10-CM

## 2024-04-29 DIAGNOSIS — G47.00 INSOMNIA, UNSPECIFIED TYPE: ICD-10-CM

## 2024-04-29 DIAGNOSIS — R53.1 WEAKNESS: ICD-10-CM

## 2024-04-29 DIAGNOSIS — G30.9 ALZHEIMERS DISEASE (MULTI): Primary | ICD-10-CM

## 2024-04-29 DIAGNOSIS — F02.80 ALZHEIMERS DISEASE (MULTI): Primary | ICD-10-CM

## 2024-04-29 PROCEDURE — 99309 SBSQ NF CARE MODERATE MDM 30: CPT | Performed by: REGISTERED NURSE

## 2024-04-29 NOTE — LETTER
Patient: Jolene Jack  : 1935    Encounter Date: 2024    PROGRESS NOTE    Subjective  Chief complaint: Jolene Jack is a 88 y.o. female patient being seen and evaluated for monthly general medical care and follow-up    HPI:  24 Patient presents for general medical care and f/u.  Patient seen and examined at bedside.  No issues per nursing.  Patient has no acute complaints.        Objective  Vital signs: 122/67, 97.6, 71, 18, 96%    Physical Exam  Constitutional:       General: She is not in acute distress.  Eyes:      Extraocular Movements: Extraocular movements intact.   Pulmonary:      Effort: Pulmonary effort is normal.   Musculoskeletal:      Cervical back: Neck supple.   Neurological:      Mental Status: She is alert.   Psychiatric:         Mood and Affect: Mood normal.         Behavior: Behavior is cooperative.         Assessment/Plan  Problem List Items Addressed This Visit       Alzheimers disease (Multi) - Primary     Patient at baseline no behaviors reported by staff         Weakness     Encourage pt to ask for assistance          Insomnia     Stable   sleeping well   Melatonin   monitor         Depression     Stable           Medications, treatments, and labs reviewed  Continue medications and treatments as listed in EMR    Scribe Attestation  I, Johan Pop   attest that this documentation has been prepared under the direction and in the presence of MEI Charles.    Provider Attestation - Scribe documentation  All medical record entries made by the Scribe were at my direction and personally dictated by me. I have reviewed the chart and agree that the record accurately reflects my personal performance of the history, physical exam, discussion and plan.    MEI Charles      Electronically Signed By: MEI Charles   24 10:50 AM

## 2024-04-30 NOTE — PROGRESS NOTES
PROGRESS NOTE    Subjective   Chief complaint: Jolene Jack is a 88 y.o. female patient being seen and evaluated for monthly general medical care and follow-up    HPI:  4/29/24 Patient presents for general medical care and f/u.  Patient seen and examined at bedside.  No issues per nursing.  Patient has no acute complaints.        Objective   Vital signs: 122/67, 97.6, 71, 18, 96%    Physical Exam  Constitutional:       General: She is not in acute distress.  Eyes:      Extraocular Movements: Extraocular movements intact.   Pulmonary:      Effort: Pulmonary effort is normal.   Musculoskeletal:      Cervical back: Neck supple.   Neurological:      Mental Status: She is alert.   Psychiatric:         Mood and Affect: Mood normal.         Behavior: Behavior is cooperative.         Assessment/Plan   Problem List Items Addressed This Visit       Alzheimers disease (Multi) - Primary     Patient at baseline no behaviors reported by staff         Weakness     Encourage pt to ask for assistance          Insomnia     Stable   sleeping well   Melatonin   monitor         Depression     Stable           Medications, treatments, and labs reviewed  Continue medications and treatments as listed in EMR    Scribe Attestation  IBelkys Scribe   attest that this documentation has been prepared under the direction and in the presence of MEI Charles.    Provider Attestation - Scribe documentation  All medical record entries made by the Scribe were at my direction and personally dictated by me. I have reviewed the chart and agree that the record accurately reflects my personal performance of the history, physical exam, discussion and plan.    MEI Charles

## 2024-05-20 ENCOUNTER — NURSING HOME VISIT (OUTPATIENT)
Dept: POST ACUTE CARE | Facility: EXTERNAL LOCATION | Age: 89
End: 2024-05-20
Payer: COMMERCIAL

## 2024-05-20 DIAGNOSIS — G47.00 INSOMNIA, UNSPECIFIED TYPE: ICD-10-CM

## 2024-05-20 DIAGNOSIS — F02.80 ALZHEIMERS DISEASE (MULTI): Primary | ICD-10-CM

## 2024-05-20 DIAGNOSIS — R53.1 WEAKNESS: ICD-10-CM

## 2024-05-20 DIAGNOSIS — G30.9 ALZHEIMERS DISEASE (MULTI): Primary | ICD-10-CM

## 2024-05-20 DIAGNOSIS — F32.A DEPRESSION, UNSPECIFIED DEPRESSION TYPE: ICD-10-CM

## 2024-05-20 PROCEDURE — 99309 SBSQ NF CARE MODERATE MDM 30: CPT | Performed by: REGISTERED NURSE

## 2024-05-20 NOTE — LETTER
Patient: Jolene Jack  : 1935    Encounter Date: 2024    PROGRESS NOTE    Subjective  Chief complaint: Jolene Jack is a 88 y.o. female patient being seen and evaluated for monthly general medical care and follow-up    HPI:  24 Patient presents for general medical care and f/u.  Patient seen and examined at bedside.  No issues per nursing.  Patient has no acute complaints.        Objective  Vital signs: 132/76, 98, 76, 18, 96%    Physical Exam  Constitutional:       General: She is not in acute distress.  Eyes:      Extraocular Movements: Extraocular movements intact.   Pulmonary:      Effort: Pulmonary effort is normal.   Musculoskeletal:      Cervical back: Neck supple.   Neurological:      Mental Status: She is alert.   Psychiatric:         Mood and Affect: Mood normal.         Behavior: Behavior is cooperative.         Assessment/Plan  Problem List Items Addressed This Visit       Alzheimers disease (Multi) - Primary     Patient at baseline no behaviors reported by staff         Weakness     Encourage pt to ask for assistance          Insomnia     Stable   sleeping well   Melatonin   monitor         Depression     Stable           Medications, treatments, and labs reviewed  Continue medications and treatments as listed in EMR    Scribe Attestation  I, Johan Pop   attest that this documentation has been prepared under the direction and in the presence of MEI Charles.    Provider Attestation - Scribe documentation  All medical record entries made by the Scribe were at my direction and personally dictated by me. I have reviewed the chart and agree that the record accurately reflects my personal performance of the history, physical exam, discussion and plan.    MEI Charles      Electronically Signed By: MEI Charles   24  8:37 PM

## 2024-05-21 NOTE — PROGRESS NOTES
PROGRESS NOTE    Subjective   Chief complaint: Jolene Jack is a 88 y.o. female patient being seen and evaluated for monthly general medical care and follow-up    HPI:  5/20/24 Patient presents for general medical care and f/u.  Patient seen and examined at bedside.  No issues per nursing.  Patient has no acute complaints.        Objective   Vital signs: 132/76, 98, 76, 18, 96%    Physical Exam  Constitutional:       General: She is not in acute distress.  Eyes:      Extraocular Movements: Extraocular movements intact.   Pulmonary:      Effort: Pulmonary effort is normal.   Musculoskeletal:      Cervical back: Neck supple.   Neurological:      Mental Status: She is alert.   Psychiatric:         Mood and Affect: Mood normal.         Behavior: Behavior is cooperative.         Assessment/Plan   Problem List Items Addressed This Visit       Alzheimers disease (Multi) - Primary     Patient at baseline no behaviors reported by staff         Weakness     Encourage pt to ask for assistance          Insomnia     Stable   sleeping well   Melatonin   monitor         Depression     Stable           Medications, treatments, and labs reviewed  Continue medications and treatments as listed in EMR    Scribe Attestation  IBelkys Scribe   attest that this documentation has been prepared under the direction and in the presence of MEI Charles.    Provider Attestation - Scribe documentation  All medical record entries made by the Scribe were at my direction and personally dictated by me. I have reviewed the chart and agree that the record accurately reflects my personal performance of the history, physical exam, discussion and plan.    MEI Charles

## 2024-06-24 ENCOUNTER — NURSING HOME VISIT (OUTPATIENT)
Dept: POST ACUTE CARE | Facility: EXTERNAL LOCATION | Age: 89
End: 2024-06-24
Payer: COMMERCIAL

## 2024-06-24 DIAGNOSIS — G30.9 ALZHEIMERS DISEASE (MULTI): Primary | ICD-10-CM

## 2024-06-24 DIAGNOSIS — R53.1 WEAKNESS: ICD-10-CM

## 2024-06-24 DIAGNOSIS — F32.A DEPRESSION, UNSPECIFIED DEPRESSION TYPE: ICD-10-CM

## 2024-06-24 DIAGNOSIS — F02.80 ALZHEIMERS DISEASE (MULTI): Primary | ICD-10-CM

## 2024-06-24 DIAGNOSIS — G47.00 INSOMNIA, UNSPECIFIED TYPE: ICD-10-CM

## 2024-06-24 PROCEDURE — 99308 SBSQ NF CARE LOW MDM 20: CPT | Performed by: REGISTERED NURSE

## 2024-06-24 NOTE — LETTER
Patient: Jolene Jack  : 1935    Encounter Date: 2024    PROGRESS NOTE    Subjective  Chief complaint: Jolene Jack is a 88 y.o. female who is an acute skilled patient being seen and evaluated for weakness    HPI:  Patient seen and examined at bedside.  Patient denies n/v/f/c.  Continues working in therapy.  No new complaints.    Objective  Vital signs: 109/67, 97.5, 89, 18, 95%    Physical Exam  Constitutional:       General: She is not in acute distress.  Eyes:      Extraocular Movements: Extraocular movements intact.   Pulmonary:      Effort: Pulmonary effort is normal.   Musculoskeletal:      Cervical back: Neck supple.   Neurological:      Mental Status: She is alert.   Psychiatric:         Mood and Affect: Mood normal.         Behavior: Behavior is cooperative.         Assessment/Plan  Problem List Items Addressed This Visit       Alzheimers disease (Multi) - Primary     Patient at baseline no behaviors reported by staff         Weakness     Encourage pt to ask for assistance          Insomnia     Stable   sleeping well   Melatonin   monitor         Depression     Stable           Medications, treatments, and labs reviewed  Continue medications and treatments as listed in [facility]    Scribe Attestation  IBelkys Scribe   attest that this documentation has been prepared under the direction and in the presence of MEI Charles.    Provider Attestation - Scribe documentation  All medical record entries made by the Scribe were at my direction and personally dictated by me. I have reviewed the chart and agree that the record accurately reflects my personal performance of the history, physical exam, discussion and plan.    MEI Charles        Electronically Signed By: MEI Charles   24 12:08 PM

## 2024-06-26 NOTE — PROGRESS NOTES
PROGRESS NOTE    Subjective   Chief complaint: Jolene Jack is a 88 y.o. female who is an acute skilled patient being seen and evaluated for weakness    HPI:  Patient seen and examined at bedside.  Patient denies n/v/f/c.  Continues working in therapy.  No new complaints.    Objective   Vital signs: 109/67, 97.5, 89, 18, 95%    Physical Exam  Constitutional:       General: She is not in acute distress.  Eyes:      Extraocular Movements: Extraocular movements intact.   Pulmonary:      Effort: Pulmonary effort is normal.   Musculoskeletal:      Cervical back: Neck supple.   Neurological:      Mental Status: She is alert.   Psychiatric:         Mood and Affect: Mood normal.         Behavior: Behavior is cooperative.         Assessment/Plan   Problem List Items Addressed This Visit       Alzheimers disease (Multi) - Primary     Patient at baseline no behaviors reported by staff         Weakness     Encourage pt to ask for assistance          Insomnia     Stable   sleeping well   Melatonin   monitor         Depression     Stable           Medications, treatments, and labs reviewed  Continue medications and treatments as listed in [facility]    Scribe Attestation  Belkys VAUGHN Scribe   attest that this documentation has been prepared under the direction and in the presence of MEI Charles.    Provider Attestation - Scribe documentation  All medical record entries made by the Scribe were at my direction and personally dictated by me. I have reviewed the chart and agree that the record accurately reflects my personal performance of the history, physical exam, discussion and plan.    MEI Charles

## 2024-07-31 ENCOUNTER — NURSING HOME VISIT (OUTPATIENT)
Dept: POST ACUTE CARE | Facility: EXTERNAL LOCATION | Age: 89
End: 2024-07-31
Payer: COMMERCIAL

## 2024-07-31 DIAGNOSIS — F32.A DEPRESSION, UNSPECIFIED DEPRESSION TYPE: ICD-10-CM

## 2024-07-31 DIAGNOSIS — F02.80 ALZHEIMERS DISEASE (MULTI): ICD-10-CM

## 2024-07-31 DIAGNOSIS — R53.1 WEAKNESS: Primary | ICD-10-CM

## 2024-07-31 DIAGNOSIS — G30.9 ALZHEIMERS DISEASE (MULTI): ICD-10-CM

## 2024-07-31 PROCEDURE — 99309 SBSQ NF CARE MODERATE MDM 30: CPT | Performed by: REGISTERED NURSE

## 2024-07-31 NOTE — LETTER
Patient: Jolene Jack  : 1935    Encounter Date: 2024    PROGRESS NOTE    Subjective  Chief complaint: Jolene Jack is a 88 y.o. female patient being seen and evaluated for monthly general medical care and follow-up    HPI:  Patient presents for general medical care and f/u.  Patient seen and examined at bedside.  No issues per nursing.  Patient has no acute complaints.        Objective  Vital signs: 99/50, 97.9, 75, 16, 97%    Physical Exam  Constitutional:       General: She is not in acute distress.  Eyes:      Extraocular Movements: Extraocular movements intact.   Pulmonary:      Effort: Pulmonary effort is normal.   Musculoskeletal:      Cervical back: Neck supple.   Neurological:      Mental Status: She is alert.   Psychiatric:         Mood and Affect: Mood normal.         Behavior: Behavior is cooperative.         Assessment/Plan  Problem List Items Addressed This Visit       Alzheimers disease (Multi)     Patient at baseline no behaviors reported by staff         Weakness - Primary     Encourage pt to ask for assistance          Depression     Stable           Medications, treatments, and labs reviewed  Continue medications and treatments as listed in EMR    Scribe Attestation  IBelkys Scribe   attest that this documentation has been prepared under the direction and in the presence of MEI Charles.    Provider Attestation - Scribe documentation  All medical record entries made by the Scribe were at my direction and personally dictated by me. I have reviewed the chart and agree that the record accurately reflects my personal performance of the history, physical exam, discussion and plan.    MEI Charles      Electronically Signed By: MEI Charles   24  3:23 PM

## 2024-08-05 NOTE — PROGRESS NOTES
PROGRESS NOTE    Subjective   Chief complaint: Jolene Jack is a 88 y.o. female patient being seen and evaluated for monthly general medical care and follow-up    HPI:  Patient presents for general medical care and f/u.  Patient seen and examined at bedside.  No issues per nursing.  Patient has no acute complaints.        Objective   Vital signs: 99/50, 97.9, 75, 16, 97%    Physical Exam  Constitutional:       General: She is not in acute distress.  Eyes:      Extraocular Movements: Extraocular movements intact.   Pulmonary:      Effort: Pulmonary effort is normal.   Musculoskeletal:      Cervical back: Neck supple.   Neurological:      Mental Status: She is alert.   Psychiatric:         Mood and Affect: Mood normal.         Behavior: Behavior is cooperative.         Assessment/Plan   Problem List Items Addressed This Visit       Alzheimers disease (Multi)     Patient at baseline no behaviors reported by staff         Weakness - Primary     Encourage pt to ask for assistance          Depression     Stable           Medications, treatments, and labs reviewed  Continue medications and treatments as listed in EMR    Scribe Attestation  IBelkys Scribe   attest that this documentation has been prepared under the direction and in the presence of MEI Charles.    Provider Attestation - Scribe documentation  All medical record entries made by the Scribe were at my direction and personally dictated by me. I have reviewed the chart and agree that the record accurately reflects my personal performance of the history, physical exam, discussion and plan.    MEI Charles

## 2024-08-12 ENCOUNTER — NURSING HOME VISIT (OUTPATIENT)
Dept: POST ACUTE CARE | Facility: EXTERNAL LOCATION | Age: 89
End: 2024-08-12
Payer: MEDICARE

## 2024-08-12 DIAGNOSIS — F02.80 ALZHEIMERS DISEASE (MULTI): ICD-10-CM

## 2024-08-12 DIAGNOSIS — R53.1 WEAKNESS: ICD-10-CM
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
## 2024-08-12 DIAGNOSIS — G30.9 ALZHEIMERS DISEASE (MULTI): ICD-10-CM

## 2024-08-12 PROCEDURE — 99308 SBSQ NF CARE LOW MDM 20: CPT | Performed by: REGISTERED NURSE

## 2024-08-12 NOTE — LETTER
Patient: Jolene Jack  : 1935    Encounter Date: 2024    PROGRESS NOTE    Subjective  Chief complaint: Jolene Jack is a 88 y.o. female who is a long term care patient being seen and evaluated for fall.    HPI:  HPI Pt s/p fall denies pain   Objective  Vital signs: 112/66, 97, 77, 18, 95%    Physical Exam  Constitutional:       General: She is not in acute distress.  Eyes:      Extraocular Movements: Extraocular movements intact.   Pulmonary:      Effort: Pulmonary effort is normal.   Musculoskeletal:      Cervical back: Neck supple.   Neurological:      Mental Status: She is alert.   Psychiatric:         Mood and Affect: Mood normal.         Behavior: Behavior is cooperative.         Assessment/Plan  Problem List Items Addressed This Visit       Fall - Primary     Neurochecks           Medications, treatments, and labs reviewed  Continue medications and treatments as listed in [facility]    Scribe Attestation  Belkys VAUGHN Scribe   attest that this documentation has been prepared under the direction and in the presence of MEI Charles.    Provider Attestation - Scribe documentation  All medical record entries made by the Scribe were at my direction and personally dictated by me. I have reviewed the chart and agree that the record accurately reflects my personal performance of the history, physical exam, discussion and plan.    MEI Charles          Electronically Signed By: MEI Charles   24 10:43 AM

## 2024-08-13 NOTE — PROGRESS NOTES
PROGRESS NOTE    Subjective   Chief complaint: Jolene Jack is a 88 y.o. female who is a long term care patient being seen and evaluated for fall.    HPI:  HPI Pt s/p fall denies pain   Objective   Vital signs: 112/66, 97, 77, 18, 95%    Physical Exam  Constitutional:       General: She is not in acute distress.  Eyes:      Extraocular Movements: Extraocular movements intact.   Pulmonary:      Effort: Pulmonary effort is normal.   Musculoskeletal:      Cervical back: Neck supple.   Neurological:      Mental Status: She is alert.   Psychiatric:         Mood and Affect: Mood normal.         Behavior: Behavior is cooperative.         Assessment/Plan   Problem List Items Addressed This Visit       Alzheimers disease (Multi)     Patient at baseline no behaviors reported by staff         Weakness     Encourage pt to ask for assistance          Fall - Primary     Neurochecks           Medications, treatments, and labs reviewed  Continue medications and treatments as listed in [facility]    Scribe Attestation  I, Johan Pop   attest that this documentation has been prepared under the direction and in the presence of MEI Charles.    Provider Attestation - Scribe documentation  All medical record entries made by the Scribe were at my direction and personally dictated by me. I have reviewed the chart and agree that the record accurately reflects my personal performance of the history, physical exam, discussion and plan.    MEI Charles
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
## 2024-08-28 ENCOUNTER — NURSING HOME VISIT (OUTPATIENT)
Dept: POST ACUTE CARE | Facility: EXTERNAL LOCATION | Age: 89
End: 2024-08-28
Payer: MEDICARE

## 2024-08-28 DIAGNOSIS — F02.80 ALZHEIMERS DISEASE (MULTI): ICD-10-CM

## 2024-08-28 DIAGNOSIS — G30.9 ALZHEIMERS DISEASE (MULTI): ICD-10-CM

## 2024-08-28 DIAGNOSIS — F32.A DEPRESSION, UNSPECIFIED DEPRESSION TYPE: ICD-10-CM

## 2024-08-28 DIAGNOSIS — R53.1 WEAKNESS: Primary | ICD-10-CM

## 2024-08-28 DIAGNOSIS — G47.00 INSOMNIA, UNSPECIFIED TYPE: ICD-10-CM

## 2024-08-28 PROCEDURE — 99309 SBSQ NF CARE MODERATE MDM 30: CPT | Performed by: REGISTERED NURSE

## 2024-08-28 NOTE — LETTER
Patient: Jolene Jack  : 1935    Encounter Date: 2024    PROGRESS NOTE    Subjective  Chief complaint: Jolene Jack is a 88 y.o. female patient being seen and evaluated for monthly general medical care and follow-up    HPI:  Patient presents for general medical care and f/u.  Patient seen and examined at bedside.  No issues per nursing.  Patient has no acute complaints.        Objective  Vital signs: 111/63, 97.7, 71, 18, 96%    Physical Exam  Constitutional:       General: She is not in acute distress.  Eyes:      Extraocular Movements: Extraocular movements intact.   Pulmonary:      Effort: Pulmonary effort is normal.   Musculoskeletal:      Cervical back: Neck supple.   Neurological:      Mental Status: She is alert.   Psychiatric:         Mood and Affect: Mood normal.         Behavior: Behavior is cooperative.         Assessment/Plan  Problem List Items Addressed This Visit       Alzheimers disease (Multi)     Patient at baseline no behaviors reported by staff         Weakness - Primary     Encourage pt to ask for assistance          Insomnia     Stable   sleeping well   Melatonin   monitor         Depression     Stable           Medications, treatments, and labs reviewed  Continue medications and treatments as listed in EMR    Scribe Attestation  IBelkys Scribe   attest that this documentation has been prepared under the direction and in the presence of MEI Charles.    Provider Attestation - Scribe documentation  All medical record entries made by the Scribe were at my direction and personally dictated by me. I have reviewed the chart and agree that the record accurately reflects my personal performance of the history, physical exam, discussion and plan.    MEI Charles      Electronically Signed By: MEI Charles   24 10:38 PM

## 2024-08-29 NOTE — PROGRESS NOTES
PROGRESS NOTE    Subjective   Chief complaint: Jolene Jack is a 88 y.o. female patient being seen and evaluated for monthly general medical care and follow-up    HPI:  Patient presents for general medical care and f/u.  Patient seen and examined at bedside.  No issues per nursing.  Patient has no acute complaints.        Objective   Vital signs: 111/63, 97.7, 71, 18, 96%    Physical Exam  Constitutional:       General: She is not in acute distress.  Eyes:      Extraocular Movements: Extraocular movements intact.   Pulmonary:      Effort: Pulmonary effort is normal.   Musculoskeletal:      Cervical back: Neck supple.   Neurological:      Mental Status: She is alert.   Psychiatric:         Mood and Affect: Mood normal.         Behavior: Behavior is cooperative.         Assessment/Plan   Problem List Items Addressed This Visit       Alzheimers disease (Multi)     Patient at baseline no behaviors reported by staff         Weakness - Primary     Encourage pt to ask for assistance          Insomnia     Stable   sleeping well   Melatonin   monitor         Depression     Stable           Medications, treatments, and labs reviewed  Continue medications and treatments as listed in EMR    Scribe Attestation  IBelkys Scribe   attest that this documentation has been prepared under the direction and in the presence of MEI Charles.    Provider Attestation - Scribe documentation  All medical record entries made by the Scribe were at my direction and personally dictated by me. I have reviewed the chart and agree that the record accurately reflects my personal performance of the history, physical exam, discussion and plan.    MEI Charles

## 2024-09-23 ENCOUNTER — NURSING HOME VISIT (OUTPATIENT)
Dept: POST ACUTE CARE | Facility: EXTERNAL LOCATION | Age: 89
End: 2024-09-23
Payer: MEDICARE

## 2024-09-23 DIAGNOSIS — F32.A DEPRESSION, UNSPECIFIED DEPRESSION TYPE: ICD-10-CM

## 2024-09-23 DIAGNOSIS — G47.00 INSOMNIA, UNSPECIFIED TYPE: ICD-10-CM

## 2024-09-23 DIAGNOSIS — G30.9 ALZHEIMERS DISEASE (MULTI): ICD-10-CM

## 2024-09-23 DIAGNOSIS — R53.1 WEAKNESS: Primary | ICD-10-CM

## 2024-09-23 DIAGNOSIS — F02.80 ALZHEIMERS DISEASE (MULTI): ICD-10-CM

## 2024-09-23 PROCEDURE — 99309 SBSQ NF CARE MODERATE MDM 30: CPT | Performed by: REGISTERED NURSE

## 2024-09-23 NOTE — LETTER
Patient: Jolene Jack  : 1935    Encounter Date: 2024    PROGRESS NOTE    Subjective  Chief complaint: Jolene Jack is a 88 y.o. female patient being seen and evaluated for monthly general medical care and follow-up    HPI:  Patient presents for general medical care and f/u.  Patient seen and examined at bedside.  No issues per nursing.  Patient has no acute complaints.        Objective  Vital signs: 127/70, 98.4, 83, 18, 96%    Physical Exam  Constitutional:       General: She is not in acute distress.  Eyes:      Extraocular Movements: Extraocular movements intact.   Pulmonary:      Effort: Pulmonary effort is normal.   Musculoskeletal:      Cervical back: Neck supple.   Neurological:      Mental Status: She is alert.   Psychiatric:         Mood and Affect: Mood normal.         Behavior: Behavior is cooperative.         Assessment/Plan  Problem List Items Addressed This Visit       Alzheimers disease (Multi)     Patient at baseline no behaviors reported by staff         Weakness - Primary     Encourage pt to ask for assistance          Insomnia     Stable   sleeping well   Melatonin   monitor         Depression     Stable           Medications, treatments, and labs reviewed  Continue medications and treatments as listed in EMR    Scribe Attestation  IBelkys Scribe   attest that this documentation has been prepared under the direction and in the presence of MEI Charles.    Provider Attestation - Scribe documentation  All medical record entries made by the Scribe were at my direction and personally dictated by me. I have reviewed the chart and agree that the record accurately reflects my personal performance of the history, physical exam, discussion and plan.    MEI Charles      Electronically Signed By: MEI Charles   24  4:21 PM

## 2024-09-24 NOTE — PROGRESS NOTES
PROGRESS NOTE    Subjective   Chief complaint: Jolene Jack is a 88 y.o. female patient being seen and evaluated for monthly general medical care and follow-up    HPI:  Patient presents for general medical care and f/u.  Patient seen and examined at bedside.  No issues per nursing.  Patient has no acute complaints.        Objective   Vital signs: 127/70, 98.4, 83, 18, 96%    Physical Exam  Constitutional:       General: She is not in acute distress.  Eyes:      Extraocular Movements: Extraocular movements intact.   Pulmonary:      Effort: Pulmonary effort is normal.   Musculoskeletal:      Cervical back: Neck supple.   Neurological:      Mental Status: She is alert.   Psychiatric:         Mood and Affect: Mood normal.         Behavior: Behavior is cooperative.         Assessment/Plan   Problem List Items Addressed This Visit       Alzheimers disease (Multi)     Patient at baseline no behaviors reported by staff         Weakness - Primary     Encourage pt to ask for assistance          Insomnia     Stable   sleeping well   Melatonin   monitor         Depression     Stable           Medications, treatments, and labs reviewed  Continue medications and treatments as listed in EMR    Scribe Attestation  IBelkys Scribe   attest that this documentation has been prepared under the direction and in the presence of MEI Charles.    Provider Attestation - Scribe documentation  All medical record entries made by the Scribe were at my direction and personally dictated by me. I have reviewed the chart and agree that the record accurately reflects my personal performance of the history, physical exam, discussion and plan.    MEI Charles

## 2024-10-28 ENCOUNTER — NURSING HOME VISIT (OUTPATIENT)
Dept: POST ACUTE CARE | Facility: EXTERNAL LOCATION | Age: 89
End: 2024-10-28
Payer: MEDICARE

## 2024-10-28 DIAGNOSIS — G30.9 ALZHEIMERS DISEASE (MULTI): ICD-10-CM

## 2024-10-28 DIAGNOSIS — R53.1 WEAKNESS: Primary | ICD-10-CM

## 2024-10-28 DIAGNOSIS — F02.80 ALZHEIMERS DISEASE (MULTI): ICD-10-CM

## 2024-10-28 DIAGNOSIS — F32.A DEPRESSION, UNSPECIFIED DEPRESSION TYPE: ICD-10-CM

## 2024-10-28 PROCEDURE — 99309 SBSQ NF CARE MODERATE MDM 30: CPT | Performed by: REGISTERED NURSE

## 2024-10-28 NOTE — LETTER
Patient: Jolene Jack  : 1935    Encounter Date: 10/28/2024    PROGRESS NOTE    Subjective  Chief complaint: Jolene Jack is a 88 y.o. female patient being seen and evaluated for monthly general medical care and follow-up    HPI:  Patient presents for general medical care and f/u.  Patient seen and examined at bedside.  No issues per nursing.  Patient has no acute complaints.        Objective  Vital signs: 127/70, 97.8, 83, 18, 96%    Physical Exam  Constitutional:       General: She is not in acute distress.  Eyes:      Extraocular Movements: Extraocular movements intact.   Pulmonary:      Effort: Pulmonary effort is normal.   Musculoskeletal:      Cervical back: Neck supple.   Neurological:      Mental Status: She is alert.   Psychiatric:         Mood and Affect: Mood normal.         Behavior: Behavior is cooperative.         Assessment/Plan  Problem List Items Addressed This Visit       Alzheimers disease (Multi)     Patient at baseline no behaviors reported by staff         Weakness - Primary     Encourage pt to ask for assistance          Depression     Stable           Medications, treatments, and labs reviewed  Continue medications and treatments as listed in EMR    Scribe Attestation  IBelkys Scribe   attest that this documentation has been prepared under the direction and in the presence of MEI Charles.    Provider Attestation - Scribe documentation  All medical record entries made by the Scribe were at my direction and personally dictated by me. I have reviewed the chart and agree that the record accurately reflects my personal performance of the history, physical exam, discussion and plan.    MEI Charles      Electronically Signed By: MEI Charles   24  7:23 PM

## 2024-10-29 NOTE — PROGRESS NOTES
PROGRESS NOTE    Subjective   Chief complaint: Jolene Jack is a 88 y.o. female patient being seen and evaluated for monthly general medical care and follow-up    HPI:  Patient presents for general medical care and f/u.  Patient seen and examined at bedside.  No issues per nursing.  Patient has no acute complaints.        Objective   Vital signs: 127/70, 97.8, 83, 18, 96%    Physical Exam  Constitutional:       General: She is not in acute distress.  Eyes:      Extraocular Movements: Extraocular movements intact.   Pulmonary:      Effort: Pulmonary effort is normal.   Musculoskeletal:      Cervical back: Neck supple.   Neurological:      Mental Status: She is alert.   Psychiatric:         Mood and Affect: Mood normal.         Behavior: Behavior is cooperative.         Assessment/Plan   Problem List Items Addressed This Visit       Alzheimers disease (Multi)     Patient at baseline no behaviors reported by staff         Weakness - Primary     Encourage pt to ask for assistance          Depression     Stable           Medications, treatments, and labs reviewed  Continue medications and treatments as listed in EMR    Scribe Attestation  IBelkys Scribe   attest that this documentation has been prepared under the direction and in the presence of MEI Charles.    Provider Attestation - Scribe documentation  All medical record entries made by the Scribe were at my direction and personally dictated by me. I have reviewed the chart and agree that the record accurately reflects my personal performance of the history, physical exam, discussion and plan.    MEI Charles

## 2024-11-25 ENCOUNTER — NURSING HOME VISIT (OUTPATIENT)
Dept: POST ACUTE CARE | Facility: EXTERNAL LOCATION | Age: 89
End: 2024-11-25
Payer: MEDICARE

## 2024-11-25 DIAGNOSIS — R53.1 WEAKNESS: Primary | ICD-10-CM

## 2024-11-25 DIAGNOSIS — G30.9 ALZHEIMERS DISEASE (MULTI): ICD-10-CM

## 2024-11-25 DIAGNOSIS — F32.A DEPRESSION, UNSPECIFIED DEPRESSION TYPE: ICD-10-CM

## 2024-11-25 DIAGNOSIS — F02.80 ALZHEIMERS DISEASE (MULTI): ICD-10-CM

## 2024-11-25 PROCEDURE — 99309 SBSQ NF CARE MODERATE MDM 30: CPT | Performed by: REGISTERED NURSE

## 2024-11-25 NOTE — LETTER
Patient: Jolene Jack  : 1935    Encounter Date: 2024    PROGRESS NOTE    Subjective  Chief complaint: Jolene Jack is a 88 y.o. female patient being seen and evaluated for monthly general medical care and follow-up    HPI:  Patient presents for general medical care and f/u. Patient seen and examined at bedside. No issues per nursing. Patient has no acute complaints.        Objective  Vital signs: 127/70, 97.8, 83, 18, 96%    Physical Exam  Constitutional:       General: She is not in acute distress.  Eyes:      Extraocular Movements: Extraocular movements intact.   Pulmonary:      Effort: Pulmonary effort is normal.   Musculoskeletal:      Cervical back: Neck supple.   Neurological:      Mental Status: She is alert.   Psychiatric:         Mood and Affect: Mood normal.         Behavior: Behavior is cooperative.         Assessment/Plan  Problem List Items Addressed This Visit       Alzheimers disease (Multi)     Patient at baseline no behaviors reported by staff         Weakness - Primary     Encourage pt to ask for assistance          Depression     Stable           Medications, treatments, and labs reviewed  Continue medications and treatments as listed in EMR    Scribe Attestation  IBelkys Scribe   attest that this documentation has been prepared under the direction and in the presence of MEI Charles.    Provider Attestation - Scribe documentation  All medical record entries made by the Scribe were at my direction and personally dictated by me. I have reviewed the chart and agree that the record accurately reflects my personal performance of the history, physical exam, discussion and plan.    MEI Charles      Electronically Signed By: MEI Charles   24 11:56 PM

## 2024-12-24 ENCOUNTER — NURSING HOME VISIT (OUTPATIENT)
Dept: POST ACUTE CARE | Facility: EXTERNAL LOCATION | Age: 89
End: 2024-12-24
Payer: MEDICARE

## 2024-12-24 DIAGNOSIS — F32.A DEPRESSION, UNSPECIFIED DEPRESSION TYPE: ICD-10-CM

## 2024-12-24 DIAGNOSIS — F02.80 ALZHEIMERS DISEASE (MULTI): Primary | ICD-10-CM

## 2024-12-24 DIAGNOSIS — R53.1 WEAKNESS: ICD-10-CM

## 2024-12-24 DIAGNOSIS — G47.00 INSOMNIA, UNSPECIFIED TYPE: ICD-10-CM

## 2024-12-24 DIAGNOSIS — G30.9 ALZHEIMERS DISEASE (MULTI): Primary | ICD-10-CM

## 2024-12-24 PROCEDURE — 99309 SBSQ NF CARE MODERATE MDM 30: CPT | Performed by: REGISTERED NURSE

## 2024-12-24 NOTE — LETTER
Patient: Jolene Jack  : 1935    Encounter Date: 2024    PROGRESS NOTE    Subjective  Chief complaint: Jolene Jack is a 89 y.o. female patient being seen and evaluated for monthly general medical care and follow-up    HPI:  Patient presents for general medical care and f/u.  Patient seen and examined at bedside.  No issues per nursing.  Patient has no acute complaints.        Objective  Vital signs: 118/74, 97.9, 70, 18, 98%    Physical Exam  Constitutional:       General: She is not in acute distress.  Eyes:      Extraocular Movements: Extraocular movements intact.   Pulmonary:      Effort: Pulmonary effort is normal.   Musculoskeletal:      Cervical back: Neck supple.   Neurological:      Mental Status: She is alert.   Psychiatric:         Mood and Affect: Mood normal.         Behavior: Behavior is cooperative.         Assessment/Plan  Problem List Items Addressed This Visit       Alzheimers disease (Multi) - Primary     Patient at baseline no behaviors reported by staff         Weakness     Encourage pt to ask for assistance          Insomnia     Stable   sleeping well   Melatonin   monitor         Depression     Stable           Medications, treatments, and labs reviewed  Continue medications and treatments as listed in EMR    Scribe Attestation  IBelkys Scribe   attest that this documentation has been prepared under the direction and in the presence of MEI Charles.    Provider Attestation - Scribe documentation  All medical record entries made by the Scribe were at my direction and personally dictated by me. I have reviewed the chart and agree that the record accurately reflects my personal performance of the history, physical exam, discussion and plan.    MEI Charles      Electronically Signed By: MEI Charles   24 11:44 AM

## 2024-12-26 NOTE — PROGRESS NOTES
PROGRESS NOTE    Subjective   Chief complaint: Jolene Jack is a 89 y.o. female patient being seen and evaluated for monthly general medical care and follow-up    HPI:  Patient presents for general medical care and f/u.  Patient seen and examined at bedside.  No issues per nursing.  Patient has no acute complaints.        Objective   Vital signs: 118/74, 97.9, 70, 18, 98%    Physical Exam  Constitutional:       General: She is not in acute distress.  Eyes:      Extraocular Movements: Extraocular movements intact.   Pulmonary:      Effort: Pulmonary effort is normal.   Musculoskeletal:      Cervical back: Neck supple.   Neurological:      Mental Status: She is alert.   Psychiatric:         Mood and Affect: Mood normal.         Behavior: Behavior is cooperative.         Assessment/Plan   Problem List Items Addressed This Visit       Alzheimers disease (Multi) - Primary     Patient at baseline no behaviors reported by staff         Weakness     Encourage pt to ask for assistance          Insomnia     Stable   sleeping well   Melatonin   monitor         Depression     Stable           Medications, treatments, and labs reviewed  Continue medications and treatments as listed in EMR    Scribe Attestation  IBelkys Scribe   attest that this documentation has been prepared under the direction and in the presence of MEI Charles.    Provider Attestation - Scribe documentation  All medical record entries made by the Scribe were at my direction and personally dictated by me. I have reviewed the chart and agree that the record accurately reflects my personal performance of the history, physical exam, discussion and plan.    MEI Charles